# Patient Record
Sex: MALE | Race: WHITE | NOT HISPANIC OR LATINO | Employment: FULL TIME | URBAN - METROPOLITAN AREA
[De-identification: names, ages, dates, MRNs, and addresses within clinical notes are randomized per-mention and may not be internally consistent; named-entity substitution may affect disease eponyms.]

---

## 2021-04-14 DIAGNOSIS — Z23 ENCOUNTER FOR IMMUNIZATION: ICD-10-CM

## 2024-07-29 ENCOUNTER — APPOINTMENT (EMERGENCY)
Dept: RADIOLOGY | Facility: HOSPITAL | Age: 44
End: 2024-07-29
Payer: COMMERCIAL

## 2024-07-29 ENCOUNTER — HOSPITAL ENCOUNTER (EMERGENCY)
Facility: HOSPITAL | Age: 44
Discharge: HOME | End: 2024-07-29
Attending: STUDENT IN AN ORGANIZED HEALTH CARE EDUCATION/TRAINING PROGRAM | Admitting: STUDENT IN AN ORGANIZED HEALTH CARE EDUCATION/TRAINING PROGRAM
Payer: COMMERCIAL

## 2024-07-29 VITALS
HEART RATE: 65 BPM | TEMPERATURE: 97.7 F | RESPIRATION RATE: 18 BRPM | BODY MASS INDEX: 31.15 KG/M2 | SYSTOLIC BLOOD PRESSURE: 121 MMHG | OXYGEN SATURATION: 96 % | WEIGHT: 230 LBS | DIASTOLIC BLOOD PRESSURE: 65 MMHG | HEIGHT: 72 IN

## 2024-07-29 DIAGNOSIS — N20.0 NEPHROLITHIASIS: ICD-10-CM

## 2024-07-29 DIAGNOSIS — N13.30 HYDRONEPHROSIS, UNSPECIFIED HYDRONEPHROSIS TYPE: Primary | ICD-10-CM

## 2024-07-29 LAB
ALBUMIN SERPL-MCNC: 4.5 G/DL (ref 3.5–5.7)
ALP SERPL-CCNC: 55 IU/L (ref 34–125)
ALT SERPL-CCNC: 19 IU/L (ref 7–52)
ANION GAP SERPL CALC-SCNC: 9 MEQ/L (ref 3–15)
AST SERPL-CCNC: 16 IU/L (ref 13–39)
BACTERIA URNS QL MICRO: ABNORMAL /HPF
BASOPHILS # BLD: 0.08 K/UL (ref 0.01–0.1)
BASOPHILS NFR BLD: 0.8 %
BILIRUB SERPL-MCNC: 0.4 MG/DL (ref 0.3–1.2)
BILIRUB UR QL STRIP.AUTO: NEGATIVE MG/DL
BUN SERPL-MCNC: 20 MG/DL (ref 7–25)
CALCIUM SERPL-MCNC: 9.9 MG/DL (ref 8.6–10.3)
CHLORIDE SERPL-SCNC: 104 MEQ/L (ref 98–107)
CLARITY UR REFRACT.AUTO: CLEAR
CO2 SERPL-SCNC: 27 MEQ/L (ref 21–31)
COLOR UR AUTO: YELLOW
CREAT SERPL-MCNC: 1 MG/DL (ref 0.7–1.3)
DIFFERENTIAL METHOD BLD: NORMAL
EGFRCR SERPLBLD CKD-EPI 2021: >60 ML/MIN/1.73M*2
EOSINOPHIL # BLD: 0.23 K/UL (ref 0.04–0.54)
EOSINOPHIL NFR BLD: 2.4 %
ERYTHROCYTE [DISTWIDTH] IN BLOOD BY AUTOMATED COUNT: 12 % (ref 11.6–14.4)
GLUCOSE SERPL-MCNC: 154 MG/DL (ref 70–99)
GLUCOSE UR STRIP.AUTO-MCNC: NEGATIVE MG/DL
HCT VFR BLD AUTO: 45.4 % (ref 40.1–51)
HGB BLD-MCNC: 15.2 G/DL (ref 13.7–17.5)
HGB UR QL STRIP.AUTO: 3
HYALINE CASTS #/AREA URNS LPF: ABNORMAL /LPF
IMM GRANULOCYTES # BLD AUTO: 0.04 K/UL (ref 0–0.08)
IMM GRANULOCYTES NFR BLD AUTO: 0.4 %
KETONES UR STRIP.AUTO-MCNC: NEGATIVE MG/DL
LEUKOCYTE ESTERASE UR QL STRIP.AUTO: NEGATIVE
LIPASE SERPL-CCNC: 33 U/L (ref 11–82)
LYMPHOCYTES # BLD: 3.47 K/UL (ref 1.2–3.5)
LYMPHOCYTES NFR BLD: 35.7 %
MCH RBC QN AUTO: 29.5 PG (ref 28–33.2)
MCHC RBC AUTO-ENTMCNC: 33.5 G/DL (ref 32.2–36.5)
MCV RBC AUTO: 88.2 FL (ref 83–98)
MONOCYTES # BLD: 0.68 K/UL (ref 0.3–1)
MONOCYTES NFR BLD: 7 %
MUCOUS THREADS URNS QL MICRO: ABNORMAL /LPF
NEUTROPHILS # BLD: 5.21 K/UL (ref 1.7–7)
NEUTS SEG NFR BLD: 53.7 %
NITRITE UR QL STRIP.AUTO: NEGATIVE
NRBC BLD-RTO: 0 %
PDW BLD AUTO: 11.1 FL (ref 9.4–12.4)
PH UR STRIP.AUTO: 6.5 [PH]
PLATELET # BLD AUTO: 311 K/UL (ref 150–350)
POTASSIUM SERPL-SCNC: 3.9 MEQ/L (ref 3.5–5.1)
PROT SERPL-MCNC: 7.6 G/DL (ref 6–8.2)
PROT UR QL STRIP.AUTO: 1
RBC # BLD AUTO: 5.15 M/UL (ref 4.5–5.8)
RBC #/AREA URNS HPF: ABNORMAL /HPF
SODIUM SERPL-SCNC: 140 MEQ/L (ref 136–145)
SP GR UR REFRACT.AUTO: >1.035
SQUAMOUS URNS QL MICRO: ABNORMAL /HPF
UROBILINOGEN UR STRIP-ACNC: 0.2 EU/DL
WBC # BLD AUTO: 9.71 K/UL (ref 3.8–10.5)
WBC #/AREA URNS HPF: ABNORMAL /HPF

## 2024-07-29 PROCEDURE — 96361 HYDRATE IV INFUSION ADD-ON: CPT

## 2024-07-29 PROCEDURE — 63600000 HC DRUGS/DETAIL CODE: Mod: JZ

## 2024-07-29 PROCEDURE — 74177 CT ABD & PELVIS W/CONTRAST: CPT

## 2024-07-29 PROCEDURE — 87086 URINE CULTURE/COLONY COUNT: CPT | Performed by: STUDENT IN AN ORGANIZED HEALTH CARE EDUCATION/TRAINING PROGRAM

## 2024-07-29 PROCEDURE — 3E0333Z INTRODUCTION OF ANTI-INFLAMMATORY INTO PERIPHERAL VEIN, PERCUTANEOUS APPROACH: ICD-10-PCS | Performed by: STUDENT IN AN ORGANIZED HEALTH CARE EDUCATION/TRAINING PROGRAM

## 2024-07-29 PROCEDURE — 80053 COMPREHEN METABOLIC PANEL: CPT | Performed by: STUDENT IN AN ORGANIZED HEALTH CARE EDUCATION/TRAINING PROGRAM

## 2024-07-29 PROCEDURE — 3E033GC INTRODUCTION OF OTHER THERAPEUTIC SUBSTANCE INTO PERIPHERAL VEIN, PERCUTANEOUS APPROACH: ICD-10-PCS | Performed by: STUDENT IN AN ORGANIZED HEALTH CARE EDUCATION/TRAINING PROGRAM

## 2024-07-29 PROCEDURE — 99284 EMERGENCY DEPT VISIT MOD MDM: CPT | Mod: 25

## 2024-07-29 PROCEDURE — 85025 COMPLETE CBC W/AUTO DIFF WBC: CPT | Performed by: STUDENT IN AN ORGANIZED HEALTH CARE EDUCATION/TRAINING PROGRAM

## 2024-07-29 PROCEDURE — 3E0337Z INTRODUCTION OF ELECTROLYTIC AND WATER BALANCE SUBSTANCE INTO PERIPHERAL VEIN, PERCUTANEOUS APPROACH: ICD-10-PCS | Performed by: STUDENT IN AN ORGANIZED HEALTH CARE EDUCATION/TRAINING PROGRAM

## 2024-07-29 PROCEDURE — 83690 ASSAY OF LIPASE: CPT | Performed by: STUDENT IN AN ORGANIZED HEALTH CARE EDUCATION/TRAINING PROGRAM

## 2024-07-29 PROCEDURE — 25800000 HC PHARMACY IV SOLUTIONS

## 2024-07-29 PROCEDURE — 81003 URINALYSIS AUTO W/O SCOPE: CPT | Performed by: STUDENT IN AN ORGANIZED HEALTH CARE EDUCATION/TRAINING PROGRAM

## 2024-07-29 PROCEDURE — 96376 TX/PRO/DX INJ SAME DRUG ADON: CPT

## 2024-07-29 PROCEDURE — 36415 COLL VENOUS BLD VENIPUNCTURE: CPT | Performed by: STUDENT IN AN ORGANIZED HEALTH CARE EDUCATION/TRAINING PROGRAM

## 2024-07-29 PROCEDURE — 63600105 HC IODINE BASED CONTRAST: Mod: JZ

## 2024-07-29 PROCEDURE — 96374 THER/PROPH/DIAG INJ IV PUSH: CPT | Mod: 59

## 2024-07-29 PROCEDURE — 96375 TX/PRO/DX INJ NEW DRUG ADDON: CPT

## 2024-07-29 RX ORDER — IOPAMIDOL 755 MG/ML
100 INJECTION, SOLUTION INTRAVASCULAR
Status: COMPLETED | OUTPATIENT
Start: 2024-07-29 | End: 2024-07-29

## 2024-07-29 RX ORDER — METFORMIN HYDROCHLORIDE 1000 MG/1
1000 TABLET ORAL 2 TIMES DAILY
COMMUNITY

## 2024-07-29 RX ORDER — KETOROLAC TROMETHAMINE 15 MG/ML
15 INJECTION, SOLUTION INTRAMUSCULAR; INTRAVENOUS ONCE
Status: COMPLETED | OUTPATIENT
Start: 2024-07-29 | End: 2024-07-29

## 2024-07-29 RX ORDER — CYCLOBENZAPRINE HCL 5 MG
5 TABLET ORAL
COMMUNITY
Start: 2023-10-26

## 2024-07-29 RX ORDER — ONDANSETRON HYDROCHLORIDE 2 MG/ML
4 INJECTION, SOLUTION INTRAVENOUS ONCE
Status: COMPLETED | OUTPATIENT
Start: 2024-07-29 | End: 2024-07-29

## 2024-07-29 RX ADMIN — IOPAMIDOL 100 ML: 755 INJECTION, SOLUTION INTRAVENOUS at 12:54

## 2024-07-29 RX ADMIN — SODIUM CHLORIDE 1000 ML: 9 INJECTION, SOLUTION INTRAVENOUS at 11:56

## 2024-07-29 RX ADMIN — ONDANSETRON 4 MG: 2 INJECTION INTRAMUSCULAR; INTRAVENOUS at 11:57

## 2024-07-29 RX ADMIN — KETOROLAC TROMETHAMINE 15 MG: 15 INJECTION, SOLUTION INTRAMUSCULAR; INTRAVENOUS at 11:57

## 2024-07-29 RX ADMIN — KETOROLAC TROMETHAMINE 15 MG: 15 INJECTION, SOLUTION INTRAMUSCULAR; INTRAVENOUS at 13:09

## 2024-07-29 ASSESSMENT — ENCOUNTER SYMPTOMS
RESPIRATORY NEGATIVE: 1
VOMITING: 0
DIAPHORESIS: 0
RECTAL PAIN: 0
CHILLS: 0
DIARRHEA: 0
UNEXPECTED WEIGHT CHANGE: 0
DYSURIA: 0
ANAL BLEEDING: 0
BLOOD IN STOOL: 0
FLANK PAIN: 0
DIFFICULTY URINATING: 0
SHORTNESS OF BREATH: 0
ABDOMINAL DISTENTION: 0
NEUROLOGICAL NEGATIVE: 1
APPETITE CHANGE: 0
CHEST TIGHTNESS: 0
FEVER: 0
MUSCULOSKELETAL NEGATIVE: 1
NAUSEA: 0
FREQUENCY: 0
ABDOMINAL PAIN: 1
CONSTIPATION: 0
FATIGUE: 0
CARDIOVASCULAR NEGATIVE: 1
HEMATURIA: 0

## 2024-07-29 NOTE — DISCHARGE INSTRUCTIONS
Continue to rest and stay hydrated and drink plenty fluids.  Strain all of your urine.  Take over-the-counter Tylenol or ibuprofen per label as needed for pain.  Follow-up with PCP and urologist for further evaluation and workup.  Return to ED with any worsening symptoms or concerns.

## 2024-07-29 NOTE — ED PROVIDER NOTES
Emergency Medicine Note  HPI   HISTORY OF PRESENT ILLNESS     44-year-old male with no past history presents for evaluation of groin pain.  Patient describes sudden onset sharp pain in his right lower quadrant and groin area.  Was at rest at work when symptoms started.  Since onset pain has been persistent and constant.  Denies history of kidney stones.  Denies fevers, chills, nausea, vomiting, dysuria, hematuria, frequency, flank pain, testicular pain, testicular swelling.      Groin Pain  Associated symptoms: abdominal pain    Associated symptoms: no diarrhea, no fatigue, no fever, no nausea, no shortness of breath and no vomiting          Patient History   PAST HISTORY     Reviewed from Nursing Triage:       History reviewed. No pertinent past medical history.    History reviewed. No pertinent surgical history.    History reviewed. No pertinent family history.    Social History     Tobacco Use    Smoking status: Never    Smokeless tobacco: Never   Substance Use Topics    Alcohol use: Yes    Drug use: Never         Review of Systems   REVIEW OF SYSTEMS     Review of Systems   Constitutional:  Negative for appetite change, chills, diaphoresis, fatigue, fever and unexpected weight change.   Respiratory: Negative.  Negative for chest tightness and shortness of breath.    Cardiovascular: Negative.    Gastrointestinal:  Positive for abdominal pain. Negative for abdominal distention, anal bleeding, blood in stool, constipation, diarrhea, nausea, rectal pain and vomiting.   Genitourinary:  Negative for decreased urine volume, difficulty urinating, dysuria, enuresis, flank pain, frequency, genital sores, hematuria, penile discharge, penile pain, penile swelling, scrotal swelling, testicular pain and urgency.   Musculoskeletal: Negative.    Skin: Negative.    Neurological: Negative.          VITALS     ED Vitals      Date/Time Temp Pulse Resp BP SpO2 [facility]   07/29/24 1529 36.5 °C (97.7 °F) 65 18 121/65 96 % KAK   07/29/24  1227 -- 61 16 119/63 93 % AL   07/29/24 1130 36.2 °C (97.1 °F) 57 20 160/90 96 % JLG          Pulse Ox %: 96 % (07/29/24 1155)  Pulse Ox Interpretation: Normal (07/29/24 1155)  Heart Rate: 57 (07/29/24 1155)  Rhythm Strip Interpretation: Normal Sinus Rhythm (07/29/24 1155)     Physical Exam   PHYSICAL EXAM     Physical Exam  Vitals and nursing note reviewed. Exam conducted with a chaperone present (Dr. Cartwright).   Constitutional:       General: He is not in acute distress.     Appearance: He is not ill-appearing or toxic-appearing.   Cardiovascular:      Rate and Rhythm: Normal rate and regular rhythm.      Pulses: Normal pulses.      Heart sounds: Normal heart sounds. No murmur heard.     No friction rub. No gallop.   Pulmonary:      Effort: Pulmonary effort is normal.      Breath sounds: Normal breath sounds. No wheezing or rales.   Chest:      Chest wall: No tenderness.   Abdominal:      General: Abdomen is flat.      Palpations: Abdomen is soft.      Tenderness: There is no abdominal tenderness. There is no right CVA tenderness, left CVA tenderness, guarding or rebound.   Genitourinary:     Penis: Normal.       Testes: Normal. Cremasteric reflex is present.         Right: Mass, tenderness, swelling, testicular hydrocele or varicocele not present. Cremasteric reflex is present.          Left: Mass, tenderness, testicular hydrocele or varicocele not present. Cremasteric reflex is present.       Epididymis:      Right: Normal.      Left: Normal.   Skin:     General: Skin is warm and dry.      Capillary Refill: Capillary refill takes less than 2 seconds.   Neurological:      General: No focal deficit present.      Mental Status: He is alert and oriented to person, place, and time.           PROCEDURES     Procedures     DATA     Results       Procedure Component Value Units Date/Time    UA w/ reflex culture (ED Only) [[id number]]  (Abnormal) Collected: 07/29/24 1431    Specimen: Urine, Clean Catch Updated: 07/29/24  1500    Narrative:      The following orders were created for panel order UA w/ reflex culture (ED Only).  Procedure                               Abnormality         Status                     ---------                               -----------         ------                     UA Reflex to Culture (Ma...[[id number]]  Abnormal            Final result               UA Microscopic[[id number]]               Abnormal            Final result                 Please view results for these tests on the individual orders.    UA Microscopic [[id number]]  (Abnormal) Collected: 07/29/24 1431    Specimen: Urine, Clean Catch Updated: 07/29/24 1500     RBC, Urine 20 TO 35 /HPF      WBC, Urine 10 TO 20 /HPF      Squamous Epithelial Rare /hpf      Hyaline Cast None Seen /lpf      Bacteria, Urine Rare /HPF      Mucus Rare /LPF     UA Reflex to Culture (Macroscopic) [[id number]]  (Abnormal) Collected: 07/29/24 1431    Specimen: Urine, Clean Catch Updated: 07/29/24 1449     Color, Urine Yellow     Clarity, Urine Clear     Specific Gravity, Urine >1.035     pH, Urine 6.5     Leukocyte Esterase Negative     Comment: Results can be falsely negative due to high specific gravity, some antibiotics, glucose >3 g/dl, or WBC other than neutrophils.        Nitrite, Urine Negative     Protein, Urine +1     Glucose, Urine Negative mg/dL      Ketones, Urine Negative mg/dL      Urobilinogen, Urine 0.2 EU/dL      Bilirubin, Urine Negative mg/dL      Blood, Urine +3     Comment: The sensitivity of the occult blood test is equivalent to approximately 4 intact RBC/HPF.       Comprehensive metabolic panel [[id number]]  (Abnormal) Collected: 07/29/24 1138    Specimen: Blood, Venous Updated: 07/29/24 1220     Sodium 140 mEQ/L      Potassium 3.9 mEQ/L      Comment: Results obtained on plasma. Plasma Potassium values may be up to 0.4 mEQ/L less than serum values. The differences may be greater for patients with high platelet or white cell counts.         Chloride 104 mEQ/L      CO2 27 mEQ/L      BUN 20 mg/dL      Creatinine 1.0 mg/dL      Glucose 154 mg/dL      Calcium 9.9 mg/dL      AST (SGOT) 16 IU/L      ALT (SGPT) 19 IU/L      Alkaline Phosphatase 55 IU/L      Total Protein 7.6 g/dL      Comment: Test performed on plasma which typically contains approximately 0.4 g/dL more protein than serum.        Albumin 4.5 g/dL      Bilirubin, Total 0.4 mg/dL      eGFR >60.0 mL/min/1.73m*2      Comment: Calculation based on the Chronic Kidney Disease Epidemiology Collaboration (CKD-EPI) equation refit without adjustment for race.        Anion Gap 9 mEQ/L     Lipase [[id number]]  (Normal) Collected: 07/29/24 1138    Specimen: Blood, Venous Updated: 07/29/24 1220     Lipase 33 U/L     CBC and differential [[id number]] Collected: 07/29/24 1138    Specimen: Blood, Venous Updated: 07/29/24 1159     WBC 9.71 K/uL      RBC 5.15 M/uL      Hemoglobin 15.2 g/dL      Hematocrit 45.4 %      MCV 88.2 fL      MCH 29.5 pg      MCHC 33.5 g/dL      RDW 12.0 %      Platelets 311 K/uL      MPV 11.1 fL      Differential Type Auto     nRBC 0.0 %      Immature Granulocytes 0.4 %      Neutrophils 53.7 %      Lymphocytes 35.7 %      Monocytes 7.0 %      Eosinophils 2.4 %      Basophils 0.8 %      Immature Granulocytes, Absolute 0.04 K/uL      Neutrophils, Absolute 5.21 K/uL      Lymphocytes, Absolute 3.47 K/uL      Monocytes, Absolute 0.68 K/uL      Eosinophils, Absolute 0.23 K/uL      Basophils, Absolute 0.08 K/uL             Imaging Results              CT ABDOMEN PELVIS WITH IV CONTRAST (Final result)  Result time 07/29/24 14:01:50      Final result                   Impression:    IMPRESSION:  1.  Delayed right nephrogram and mild right hydronephrosis with a punctate  calcific density measuring 0.2 cm at the proximal right ureter near the  ureteropelvic junction, possibly representing a remnant of an ureteral calculus.  Right pyelonephritis is also possibility if there is concern for urinary  tract  infection.  Follow-up CT in two weeks to ensure resolution can be considered.  2.  Enlarged prostate      The results were critically read back with LYNDSAY KNIGHT on 7/29/2024 2:01  PM.               Narrative:    CLINICAL HISTORY: RLQ abdominal pain (Age >= 14y)    COMPARISON: None    COMMENT:    TECHNIQUE: CT of the abdomen and pelvis were performed.  Images acquired after the uneventful administration of intravenous contrast  material.  100mL of iopamidoL ([id number]) 370 mg iodine /mL (76 %) injection 100 mL    CT DOSE:  One or more dose reduction techniques (e.g. automated exposure  control, adjustment of the mA and/or kV according to patient size, use of  iterative reconstruction technique) utilized for this examination.    LOWER CHEST: Within normal limits.    LIVER: Within normal limits.  BILE DUCTS: Normal caliber.  GALLBLADDER: Normal  PANCREAS: Within normal limits.  SPLEEN: Within normal limits.  ADRENALS: Within normal limits.  KIDNEYS/URETERS/BLADDER: Delayed right renal nephrogram with segmental areas of  hypoperfusion and absent excretion of contrast on pyelographic phase imaging.  Mild right hydronephrosis.  Punctate calcification at the proximal ureter near  the ureteropelvic junction, measuring 0.2 cm on axial cortical medullary image  102.  Partially decompressed urinary bladder, limiting evaluation.  Mild diffuse  urinary bladder wall thickening.  Urinary bladder submucosal fat.    REPRODUCTIVE ORGANS: Enlarged prostate gland.  Prostatic calcifications.    BOWEL: Normal terminal ileum and appendix. Normal bowel caliber.  No ascites or free air, no fluid collection  VESSELS: Within normal limits.  LYMPH NODES: within normal limits.  ABDOMINAL WALL: Within normal limits.    BONES: Normal                                      No orders to display       Scoring tools                                  ED Course & MDM   MDM / ED COURSE / CLINICAL IMPRESSION / DISPO     Medical Decision  Making  Problems Addressed:  Hydronephrosis, unspecified hydronephrosis type: acute illness or injury  Nephrolithiasis: acute illness or injury    Amount and/or Complexity of Data Reviewed  Labs: ordered. Decision-making details documented in ED Course.  Radiology: ordered. Decision-making details documented in ED Course.    Risk  Prescription drug management.        ED Course as of 07/29/24 2314 Mon Jul 29, 2024   1201 CBC and differential  Unremarkable  [WL]   1229 Lipase: 33 [WL]   1408 CT ABDOMEN PELVIS WITH IV CONTRAST  IMPRESSION:  1.  Delayed right nephrogram and mild right hydronephrosis with a punctate  calcific density measuring 0.2 cm at the proximal right ureter near the  ureteropelvic junction, possibly representing a remnant of an ureteral calculus.  Right pyelonephritis is also possibility if there is concern for urinary tract  infection.  Follow-up CT in two weeks to ensure resolution can be considered.  2.  Enlarged prostate   [WL]   1408 Patient encouraged to provide urine sample [WL]   1500 Patient updated on results of imaging.  Likely passed his stone.  Encouraged frequent hydration, pain control and outpatient follow-up with urologist for further evaluation.  Encouraged to strain all urine.  Patient stable and comfortable with discharge home at this time.  Return precautions given.  Patient understands and is agreeable.  Question concerns answered and discussed. [WL]      ED Course User Index  [WL] Brandt Garcia PA C     Clinical Impression      Hydronephrosis, unspecified hydronephrosis type   Nephrolithiasis     _________________       ED Disposition   Discharge                       Brandt Garcia PA C  07/29/24 3213

## 2024-07-29 NOTE — ED ATTESTATION NOTE
I have personally seen and examined Gildardo Escobar.  I was involved in the care and medical decision making for this patient.    I reviewed and agree with physician assistant / nurse practitioner’s assessment and plan of care; any exceptions are documented below.      My focused history, examination, assessment and plan of care of Gildardo Escobar is as follows:    Brief History:  HPI  44-year-old male with history of diabetes presents emergency department for evaluation of right lower abdominal pain that radiates to the right groin.  Symptoms started while at work today suddenly.  Patient denies any trauma or injury.  He has not noted any urinary changes.  He has been feeling nauseous, no vomiting or diarrhea.      Focused Physical Exam:  Physical Exam    On focused physical examination, patient does have tenderness to the right lower quadrant without guarding or rebound.  He endorses mild tenderness to the right testicle.  There is no swelling.  Normal cremasteric reflex.  No hernia noted.    Assessment / Plan:        Medical Decision Making  44-year-old male presents to the emergency department for evaluation of right lower quadrant abdominal pain.  Patient is well-appearing on arrival, afebrile and hemodynamically stable.  Laboratory work here is reassuring.  No evidence of UTI though there is presence of blood in the urine.  CT abdomen and pelvis shows a 2 mm stone that is just past the right UVJ.  This is likely cause of patient's ongoing discomfort.  Will provide a urine strainer and have patient follow-up with urology in the outpatient office.    Amount and/or Complexity of Data Reviewed  Labs: ordered.  Radiology: ordered.    Risk  Prescription drug management.        I was physically present for the key/critical portions of the following procedures:  Procedures          Saul Cartwright MD  07/29/24 0809

## 2024-07-30 LAB — BACTERIA UR CULT: NORMAL
